# Patient Record
Sex: FEMALE | Race: WHITE
[De-identification: names, ages, dates, MRNs, and addresses within clinical notes are randomized per-mention and may not be internally consistent; named-entity substitution may affect disease eponyms.]

---

## 2021-11-03 ENCOUNTER — HOSPITAL ENCOUNTER (OUTPATIENT)
Dept: HOSPITAL 63 - MAMMO | Age: 71
End: 2021-11-03
Attending: FAMILY MEDICINE
Payer: MEDICARE

## 2021-11-03 DIAGNOSIS — Z12.31: Primary | ICD-10-CM

## 2021-11-03 PROCEDURE — 77067 SCR MAMMO BI INCL CAD: CPT

## 2021-11-03 PROCEDURE — 77063 BREAST TOMOSYNTHESIS BI: CPT

## 2021-11-05 NOTE — RAD
INDICATION : Routine Screening.



COMPARISON: [facility] including October 2020



TECHNIQUE: Standard mammogram screening views of the bilateral breasts were obtained with 3D tomosynt
hesis. CAD was utilized.



FINDINGS:

The breasts are scattered density.  No definite suspicious mass.



IMPRESSION:



BI-RADS Category 1:  Negative. Recommend screening mammogram in one year.



The patient was placed into the recall system with a suggested recall date for follow up imaging.



Mammography is the most sensitive method for finding small breast cancers, but it does not detect the
m all and is not a substitute for careful clinical examination.  A negative mammogram does not negate
 a clinically suspicious finding and should not result in delay in biopsying a clinically suspicious 
abnormality.



Electronically signed by: Mitchel Meyers MD (11/5/2021 2:58 PM) [id number]